# Patient Record
Sex: FEMALE | Race: WHITE | NOT HISPANIC OR LATINO | URBAN - METROPOLITAN AREA
[De-identification: names, ages, dates, MRNs, and addresses within clinical notes are randomized per-mention and may not be internally consistent; named-entity substitution may affect disease eponyms.]

---

## 2017-01-30 ENCOUNTER — OFFICE VISIT - HEALTHEAST (OUTPATIENT)
Dept: INTERNAL MEDICINE | Facility: CLINIC | Age: 82
End: 2017-01-30

## 2017-01-30 DIAGNOSIS — R05.9 COUGH: ICD-10-CM

## 2017-02-02 ENCOUNTER — COMMUNICATION - HEALTHEAST (OUTPATIENT)
Dept: INTERNAL MEDICINE | Facility: CLINIC | Age: 82
End: 2017-02-02

## 2017-04-17 ENCOUNTER — RECORDS - HEALTHEAST (OUTPATIENT)
Dept: ADMINISTRATIVE | Facility: OTHER | Age: 82
End: 2017-04-17

## 2017-04-22 ENCOUNTER — COMMUNICATION - HEALTHEAST (OUTPATIENT)
Dept: INTERNAL MEDICINE | Facility: CLINIC | Age: 82
End: 2017-04-22

## 2017-04-24 ENCOUNTER — OFFICE VISIT - HEALTHEAST (OUTPATIENT)
Dept: INTERNAL MEDICINE | Facility: CLINIC | Age: 82
End: 2017-04-24

## 2017-04-24 DIAGNOSIS — E03.9 HYPOTHYROID: ICD-10-CM

## 2017-04-24 DIAGNOSIS — R63.4 WEIGHT LOSS: ICD-10-CM

## 2017-04-24 DIAGNOSIS — I35.0 AORTIC STENOSIS: ICD-10-CM

## 2017-04-24 DIAGNOSIS — R53.83 FATIGUE: ICD-10-CM

## 2017-04-24 DIAGNOSIS — D63.1 ANEMIA OF CHRONIC KIDNEY FAILURE, STAGE 3 (MODERATE) (H): ICD-10-CM

## 2017-04-24 DIAGNOSIS — N18.30 ANEMIA OF CHRONIC KIDNEY FAILURE, STAGE 3 (MODERATE) (H): ICD-10-CM

## 2017-07-10 ENCOUNTER — COMMUNICATION - HEALTHEAST (OUTPATIENT)
Dept: INTERNAL MEDICINE | Facility: CLINIC | Age: 82
End: 2017-07-10

## 2017-07-10 DIAGNOSIS — E03.9 HYPOTHYROIDISM: ICD-10-CM

## 2017-07-25 ENCOUNTER — COMMUNICATION - HEALTHEAST (OUTPATIENT)
Dept: INTERNAL MEDICINE | Facility: CLINIC | Age: 82
End: 2017-07-25

## 2017-07-25 ENCOUNTER — OFFICE VISIT - HEALTHEAST (OUTPATIENT)
Dept: INTERNAL MEDICINE | Facility: CLINIC | Age: 82
End: 2017-07-25

## 2017-07-25 DIAGNOSIS — T14.8XXA ABRASION: ICD-10-CM

## 2017-07-27 ENCOUNTER — COMMUNICATION - HEALTHEAST (OUTPATIENT)
Dept: INTERNAL MEDICINE | Facility: CLINIC | Age: 82
End: 2017-07-27

## 2017-07-28 ENCOUNTER — RECORDS - HEALTHEAST (OUTPATIENT)
Dept: ADMINISTRATIVE | Facility: OTHER | Age: 82
End: 2017-07-28

## 2017-08-01 ENCOUNTER — COMMUNICATION - HEALTHEAST (OUTPATIENT)
Dept: INTERNAL MEDICINE | Facility: CLINIC | Age: 82
End: 2017-08-01

## 2017-08-03 ENCOUNTER — COMMUNICATION - HEALTHEAST (OUTPATIENT)
Dept: INTERNAL MEDICINE | Facility: CLINIC | Age: 82
End: 2017-08-03

## 2017-08-31 ENCOUNTER — COMMUNICATION - HEALTHEAST (OUTPATIENT)
Dept: INTERNAL MEDICINE | Facility: CLINIC | Age: 82
End: 2017-08-31

## 2017-09-01 ENCOUNTER — OFFICE VISIT - HEALTHEAST (OUTPATIENT)
Dept: INTERNAL MEDICINE | Facility: CLINIC | Age: 82
End: 2017-09-01

## 2017-09-01 DIAGNOSIS — D63.1 ANEMIA OF CHRONIC KIDNEY FAILURE, STAGE 3 (MODERATE) (H): ICD-10-CM

## 2017-09-01 DIAGNOSIS — D89.0 POLYCLONAL GAMMOPATHY: ICD-10-CM

## 2017-09-01 DIAGNOSIS — N18.30 ANEMIA OF CHRONIC KIDNEY FAILURE, STAGE 3 (MODERATE) (H): ICD-10-CM

## 2017-09-01 DIAGNOSIS — I35.0 AORTIC STENOSIS: ICD-10-CM

## 2017-09-01 DIAGNOSIS — E03.9 HYPOTHYROIDISM: ICD-10-CM

## 2017-09-01 DIAGNOSIS — N39.0 FREQUENT UTI: ICD-10-CM

## 2017-09-01 DIAGNOSIS — E03.9 HYPOTHYROID: ICD-10-CM

## 2017-09-01 DIAGNOSIS — N18.30 CKD (CHRONIC KIDNEY DISEASE) STAGE 3, GFR 30-59 ML/MIN (H): ICD-10-CM

## 2017-09-01 ASSESSMENT — MIFFLIN-ST. JEOR: SCORE: 884.39

## 2017-09-04 ENCOUNTER — COMMUNICATION - HEALTHEAST (OUTPATIENT)
Dept: INTERNAL MEDICINE | Facility: CLINIC | Age: 82
End: 2017-09-04

## 2017-09-05 ENCOUNTER — COMMUNICATION - HEALTHEAST (OUTPATIENT)
Dept: INTERNAL MEDICINE | Facility: CLINIC | Age: 82
End: 2017-09-05

## 2017-09-06 ENCOUNTER — COMMUNICATION - HEALTHEAST (OUTPATIENT)
Dept: INTERNAL MEDICINE | Facility: CLINIC | Age: 82
End: 2017-09-06

## 2017-09-07 ENCOUNTER — COMMUNICATION - HEALTHEAST (OUTPATIENT)
Dept: INTERNAL MEDICINE | Facility: CLINIC | Age: 82
End: 2017-09-07

## 2017-09-08 ENCOUNTER — RECORDS - HEALTHEAST (OUTPATIENT)
Dept: ADMINISTRATIVE | Facility: OTHER | Age: 82
End: 2017-09-08

## 2017-09-11 ENCOUNTER — COMMUNICATION - HEALTHEAST (OUTPATIENT)
Dept: INTERNAL MEDICINE | Facility: CLINIC | Age: 82
End: 2017-09-11

## 2017-09-11 ENCOUNTER — RECORDS - HEALTHEAST (OUTPATIENT)
Dept: ADMINISTRATIVE | Facility: OTHER | Age: 82
End: 2017-09-11

## 2017-09-12 ENCOUNTER — RECORDS - HEALTHEAST (OUTPATIENT)
Dept: ADMINISTRATIVE | Facility: OTHER | Age: 82
End: 2017-09-12

## 2017-09-18 ENCOUNTER — RECORDS - HEALTHEAST (OUTPATIENT)
Dept: ADMINISTRATIVE | Facility: OTHER | Age: 82
End: 2017-09-18

## 2017-09-20 ENCOUNTER — RECORDS - HEALTHEAST (OUTPATIENT)
Dept: ADMINISTRATIVE | Facility: OTHER | Age: 82
End: 2017-09-20

## 2017-09-27 ENCOUNTER — OFFICE VISIT - HEALTHEAST (OUTPATIENT)
Dept: FAMILY MEDICINE | Facility: CLINIC | Age: 82
End: 2017-09-27

## 2017-09-27 DIAGNOSIS — R50.9 FEVER: ICD-10-CM

## 2017-09-27 DIAGNOSIS — R05.9 COUGH: ICD-10-CM

## 2017-09-28 ENCOUNTER — RECORDS - HEALTHEAST (OUTPATIENT)
Dept: ADMINISTRATIVE | Facility: OTHER | Age: 82
End: 2017-09-28

## 2017-09-28 ENCOUNTER — COMMUNICATION - HEALTHEAST (OUTPATIENT)
Dept: INTERNAL MEDICINE | Facility: CLINIC | Age: 82
End: 2017-09-28

## 2017-09-29 ENCOUNTER — RECORDS - HEALTHEAST (OUTPATIENT)
Dept: ADMINISTRATIVE | Facility: OTHER | Age: 82
End: 2017-09-29

## 2017-10-05 ENCOUNTER — OFFICE VISIT - HEALTHEAST (OUTPATIENT)
Dept: ONCOLOGY | Facility: CLINIC | Age: 82
End: 2017-10-05

## 2017-10-05 ENCOUNTER — RECORDS - HEALTHEAST (OUTPATIENT)
Dept: ADMINISTRATIVE | Facility: OTHER | Age: 82
End: 2017-10-05

## 2017-10-05 DIAGNOSIS — D63.1 ANEMIA OF CHRONIC KIDNEY FAILURE, STAGE 3 (MODERATE) (H): ICD-10-CM

## 2017-10-05 DIAGNOSIS — N18.30 ANEMIA OF CHRONIC KIDNEY FAILURE, STAGE 3 (MODERATE) (H): ICD-10-CM

## 2017-10-11 ENCOUNTER — OFFICE VISIT - HEALTHEAST (OUTPATIENT)
Dept: ONCOLOGY | Facility: CLINIC | Age: 82
End: 2017-10-11

## 2017-10-11 ENCOUNTER — INFUSION - HEALTHEAST (OUTPATIENT)
Dept: INFUSION THERAPY | Facility: CLINIC | Age: 82
End: 2017-10-11

## 2017-10-11 ENCOUNTER — AMBULATORY - HEALTHEAST (OUTPATIENT)
Dept: INFUSION THERAPY | Facility: CLINIC | Age: 82
End: 2017-10-11

## 2017-10-11 DIAGNOSIS — D63.1 ANEMIA OF CHRONIC KIDNEY FAILURE, STAGE 3 (MODERATE) (H): ICD-10-CM

## 2017-10-11 DIAGNOSIS — N18.30 ANEMIA OF CHRONIC KIDNEY FAILURE, STAGE 3 (MODERATE) (H): ICD-10-CM

## 2017-10-11 LAB
BASOPHILS # BLD AUTO: 0 THOU/UL (ref 0–0.2)
BASOPHILS NFR BLD AUTO: 1 % (ref 0–2)
COMPONENT (HISTORICAL CONVERSION): NORMAL
EOSINOPHIL # BLD AUTO: 0 THOU/UL (ref 0–0.4)
EOSINOPHIL NFR BLD AUTO: 1 % (ref 0–6)
ERYTHROCYTE [DISTWIDTH] IN BLOOD BY AUTOMATED COUNT: 16.1 % (ref 11–14.5)
HCT VFR BLD AUTO: 23.9 % (ref 35–47)
HGB BLD-MCNC: 7.6 G/DL (ref 12–16)
LAB AP CHARGES (HE HISTORICAL CONVERSION): NORMAL
LYMPHOCYTES # BLD AUTO: 0.9 THOU/UL (ref 0.8–4.4)
LYMPHOCYTES NFR BLD AUTO: 23 % (ref 20–40)
MCH RBC QN AUTO: 31.8 PG (ref 27–34)
MCHC RBC AUTO-ENTMCNC: 31.8 G/DL (ref 32–36)
MCV RBC AUTO: 100 FL (ref 80–100)
MONOCYTES # BLD AUTO: 0.3 THOU/UL (ref 0–0.9)
MONOCYTES NFR BLD AUTO: 7 % (ref 2–10)
NEUTROPHILS # BLD AUTO: 2.7 THOU/UL (ref 2–7.7)
NEUTROPHILS NFR BLD AUTO: 69 % (ref 50–70)
PATH REPORT.COMMENTS IMP SPEC: NORMAL
PATH REPORT.COMMENTS IMP SPEC: NORMAL
PATH REPORT.FINAL DX SPEC: NORMAL
PATH REPORT.MICROSCOPIC SPEC OTHER STN: ABNORMAL
PATH REPORT.MICROSCOPIC SPEC OTHER STN: NORMAL
PLATELET # BLD AUTO: 159 THOU/UL (ref 140–440)
PMV BLD AUTO: 10.4 FL (ref 8.5–12.5)
RBC # BLD AUTO: 2.39 MILL/UL (ref 3.8–5.4)
STATUS (HISTORICAL CONVERSION): NORMAL
WBC: 3.9 THOU/UL (ref 4–11)

## 2017-10-11 ASSESSMENT — MIFFLIN-ST. JEOR: SCORE: 862.88

## 2017-10-12 LAB
BLD PROD TYP BPU: NORMAL
BLOOD EXPIRATION DATE: NORMAL
BLOOD TYPE: 5100
CODING SYSTEM: NORMAL
COMPONENT (HISTORICAL CONVERSION): NORMAL
CROSSMATCH: NORMAL
ISSUE DATE AND TIME: NORMAL
STATUS (HISTORICAL CONVERSION): NORMAL
UNIT ABO/RH (HISTORICAL CONVERSION): NORMAL
UNIT NUMBER: NORMAL

## 2017-10-24 ENCOUNTER — COMMUNICATION - HEALTHEAST (OUTPATIENT)
Dept: INTERNAL MEDICINE | Facility: CLINIC | Age: 82
End: 2017-10-24

## 2017-11-02 ENCOUNTER — INFUSION - HEALTHEAST (OUTPATIENT)
Dept: INFUSION THERAPY | Facility: CLINIC | Age: 82
End: 2017-11-02

## 2017-11-02 ENCOUNTER — AMBULATORY - HEALTHEAST (OUTPATIENT)
Dept: INFUSION THERAPY | Facility: CLINIC | Age: 82
End: 2017-11-02

## 2017-11-02 ENCOUNTER — OFFICE VISIT - HEALTHEAST (OUTPATIENT)
Dept: ONCOLOGY | Facility: CLINIC | Age: 82
End: 2017-11-02

## 2017-11-02 DIAGNOSIS — D63.1 ANEMIA OF CHRONIC KIDNEY FAILURE, STAGE 3 (MODERATE) (H): ICD-10-CM

## 2017-11-02 DIAGNOSIS — N18.30 ANEMIA OF CHRONIC KIDNEY FAILURE, STAGE 3 (MODERATE) (H): ICD-10-CM

## 2017-11-02 DIAGNOSIS — N18.30 CKD (CHRONIC KIDNEY DISEASE) STAGE 3, GFR 30-59 ML/MIN (H): ICD-10-CM

## 2017-11-08 ENCOUNTER — COMMUNICATION - HEALTHEAST (OUTPATIENT)
Dept: INTERNAL MEDICINE | Facility: CLINIC | Age: 82
End: 2017-11-08

## 2017-11-09 ENCOUNTER — RECORDS - HEALTHEAST (OUTPATIENT)
Dept: ADMINISTRATIVE | Facility: OTHER | Age: 82
End: 2017-11-09

## 2017-11-09 ENCOUNTER — OFFICE VISIT - HEALTHEAST (OUTPATIENT)
Dept: INTERNAL MEDICINE | Facility: CLINIC | Age: 82
End: 2017-11-09

## 2017-11-09 DIAGNOSIS — R93.89 ABNORMAL X-RAY: ICD-10-CM

## 2017-11-09 DIAGNOSIS — D63.1 ANEMIA OF CHRONIC KIDNEY FAILURE, STAGE 3 (MODERATE) (H): ICD-10-CM

## 2017-11-09 DIAGNOSIS — N18.30 ANEMIA OF CHRONIC KIDNEY FAILURE, STAGE 3 (MODERATE) (H): ICD-10-CM

## 2017-11-09 DIAGNOSIS — R05.9 COUGH: ICD-10-CM

## 2017-11-10 ENCOUNTER — RECORDS - HEALTHEAST (OUTPATIENT)
Dept: ADMINISTRATIVE | Facility: OTHER | Age: 82
End: 2017-11-10

## 2017-11-15 ENCOUNTER — INFUSION - HEALTHEAST (OUTPATIENT)
Dept: INFUSION THERAPY | Facility: CLINIC | Age: 82
End: 2017-11-15

## 2017-11-15 ENCOUNTER — HOSPITAL ENCOUNTER (OUTPATIENT)
Dept: CT IMAGING | Facility: CLINIC | Age: 82
Discharge: HOME | End: 2017-11-15
Attending: INTERNAL MEDICINE

## 2017-11-15 DIAGNOSIS — N18.30 ANEMIA OF CHRONIC KIDNEY FAILURE, STAGE 3 (MODERATE) (H): ICD-10-CM

## 2017-11-15 DIAGNOSIS — R93.89 ABNORMAL X-RAY: ICD-10-CM

## 2017-11-15 DIAGNOSIS — N18.30 CKD (CHRONIC KIDNEY DISEASE) STAGE 3, GFR 30-59 ML/MIN (H): ICD-10-CM

## 2017-11-15 DIAGNOSIS — D63.1 ANEMIA OF CHRONIC KIDNEY FAILURE, STAGE 3 (MODERATE) (H): ICD-10-CM

## 2017-11-15 LAB
CREAT SERPL-MCNC: 2.11 MG/DL (ref 0.6–1.1)
GFR SERPL CREATININE-BSD FRML MDRD: 22 ML/MIN/1.73M2

## 2017-11-21 ENCOUNTER — COMMUNICATION - HEALTHEAST (OUTPATIENT)
Dept: INTERNAL MEDICINE | Facility: CLINIC | Age: 82
End: 2017-11-21

## 2017-11-21 ENCOUNTER — RECORDS - HEALTHEAST (OUTPATIENT)
Dept: ADMINISTRATIVE | Facility: OTHER | Age: 82
End: 2017-11-21

## 2017-11-21 DIAGNOSIS — J47.9 BRONCHIECTASIS (H): ICD-10-CM

## 2017-11-24 ENCOUNTER — COMMUNICATION - HEALTHEAST (OUTPATIENT)
Dept: INTERNAL MEDICINE | Facility: CLINIC | Age: 82
End: 2017-11-24

## 2017-11-27 ENCOUNTER — COMMUNICATION - HEALTHEAST (OUTPATIENT)
Dept: INTERNAL MEDICINE | Facility: CLINIC | Age: 82
End: 2017-11-27

## 2017-11-29 ENCOUNTER — AMBULATORY - HEALTHEAST (OUTPATIENT)
Dept: INFUSION THERAPY | Facility: CLINIC | Age: 82
End: 2017-11-29

## 2017-11-29 ENCOUNTER — INFUSION - HEALTHEAST (OUTPATIENT)
Dept: INFUSION THERAPY | Facility: CLINIC | Age: 82
End: 2017-11-29

## 2017-11-29 ENCOUNTER — OFFICE VISIT - HEALTHEAST (OUTPATIENT)
Dept: ONCOLOGY | Facility: CLINIC | Age: 82
End: 2017-11-29

## 2017-11-29 DIAGNOSIS — D63.1 ANEMIA OF CHRONIC KIDNEY FAILURE, STAGE 3 (MODERATE) (H): ICD-10-CM

## 2017-11-29 DIAGNOSIS — N18.30 ANEMIA OF CHRONIC KIDNEY FAILURE, STAGE 3 (MODERATE) (H): ICD-10-CM

## 2017-11-29 DIAGNOSIS — N18.30 CKD (CHRONIC KIDNEY DISEASE) STAGE 3, GFR 30-59 ML/MIN (H): ICD-10-CM

## 2017-11-29 ASSESSMENT — MIFFLIN-ST. JEOR: SCORE: 847

## 2017-12-01 ENCOUNTER — RECORDS - HEALTHEAST (OUTPATIENT)
Dept: ADMINISTRATIVE | Facility: OTHER | Age: 82
End: 2017-12-01

## 2017-12-13 ENCOUNTER — HOME CARE/HOSPICE - HEALTHEAST (OUTPATIENT)
Dept: HOME HEALTH SERVICES | Facility: HOME HEALTH | Age: 82
End: 2017-12-13

## 2017-12-13 ENCOUNTER — COMMUNICATION - HEALTHEAST (OUTPATIENT)
Dept: INTERNAL MEDICINE | Facility: CLINIC | Age: 82
End: 2017-12-13

## 2017-12-13 ENCOUNTER — COMMUNICATION - HEALTHEAST (OUTPATIENT)
Dept: HOME HEALTH SERVICES | Facility: HOME HEALTH | Age: 82
End: 2017-12-13

## 2017-12-14 ENCOUNTER — COMMUNICATION - HEALTHEAST (OUTPATIENT)
Dept: INTERNAL MEDICINE | Facility: CLINIC | Age: 82
End: 2017-12-14

## 2017-12-14 ENCOUNTER — COMMUNICATION - HEALTHEAST (OUTPATIENT)
Dept: ONCOLOGY | Facility: CLINIC | Age: 82
End: 2017-12-14

## 2017-12-20 ENCOUNTER — INFUSION - HEALTHEAST (OUTPATIENT)
Dept: INFUSION THERAPY | Facility: CLINIC | Age: 82
End: 2017-12-20

## 2017-12-20 DIAGNOSIS — N18.30 CKD (CHRONIC KIDNEY DISEASE) STAGE 3, GFR 30-59 ML/MIN (H): ICD-10-CM

## 2017-12-20 DIAGNOSIS — D63.1 ANEMIA OF CHRONIC KIDNEY FAILURE, STAGE 3 (MODERATE) (H): ICD-10-CM

## 2017-12-20 DIAGNOSIS — N18.30 ANEMIA OF CHRONIC KIDNEY FAILURE, STAGE 3 (MODERATE) (H): ICD-10-CM

## 2017-12-20 LAB
CREAT SERPL-MCNC: 3.81 MG/DL (ref 0.6–1.1)
GFR SERPL CREATININE-BSD FRML MDRD: 11 ML/MIN/1.73M2

## 2017-12-26 ENCOUNTER — COMMUNICATION - HEALTHEAST (OUTPATIENT)
Dept: INTERNAL MEDICINE | Facility: CLINIC | Age: 82
End: 2017-12-26

## 2018-01-02 ENCOUNTER — COMMUNICATION - HEALTHEAST (OUTPATIENT)
Dept: INTERNAL MEDICINE | Facility: CLINIC | Age: 83
End: 2018-01-02

## 2018-05-08 ENCOUNTER — RECORDS - HEALTHEAST (OUTPATIENT)
Dept: ADMINISTRATIVE | Facility: OTHER | Age: 83
End: 2018-05-08

## 2019-05-23 ENCOUNTER — AMBULATORY - HEALTHEAST (OUTPATIENT)
Dept: PHARMACY | Facility: HOSPITAL | Age: 84
End: 2019-05-23

## 2021-05-30 VITALS — BODY MASS INDEX: 20.72 KG/M2 | WEIGHT: 113.3 LBS

## 2021-05-31 VITALS — HEIGHT: 63 IN | WEIGHT: 109.5 LBS | BODY MASS INDEX: 19.4 KG/M2

## 2021-05-31 VITALS — HEIGHT: 63 IN | BODY MASS INDEX: 20.18 KG/M2 | WEIGHT: 113.9 LBS

## 2021-05-31 VITALS — BODY MASS INDEX: 20.53 KG/M2 | WEIGHT: 114.4 LBS

## 2021-05-31 VITALS — WEIGHT: 106 LBS | HEIGHT: 63 IN | BODY MASS INDEX: 18.78 KG/M2

## 2021-05-31 VITALS — BODY MASS INDEX: 20.01 KG/M2 | WEIGHT: 111.2 LBS

## 2021-05-31 VITALS — BODY MASS INDEX: 19.35 KG/M2 | WEIGHT: 107.5 LBS

## 2021-06-08 NOTE — PROGRESS NOTES
ASSESSMENT/PLAN:  1. Cough  With her history, and her O2 sats, I'm inclined to go ahead and treat with both antibiotic and albuterol inhaler.  I discussed the case with her son-in-law who is a retired pulmonologist and we agreed that does not sound like prednisone or inhaled steroid or likely to help her at this point.  She has a flu influenza swab pending at her residence.  This would change our course of treatment to include Tamiflu if positive.  She does not have typical symptoms that would suggest this.  I did discuss the case with radiology as well.  We have no prior films to be old compared to.  If her O2 sats remained low and they need to contact me and need to consider whether or not we should add some O2.  Let's see what happens in the next day or so with antibiotic and albuterol.  - XR Chest PA and Lateral  - Spacer W/O Mask    Patient Instructions   Start using Proair inhaler, two puffs three times daily for 4-5 days, then twice daily with a spacer.    Start taking Zithromax, two tablets today, then one tablet daily for 4 days.     Let Dr. Herman know how things are going on Thursday this week (2/2).       Return if symptoms worsen or fail to improve.    CHIEF COMPLAINT:  Chief Complaint   Patient presents with     Cough       HISTORY OF PRESENT ILLNESS:  Wendy Rock is a 89 y.o. female presenting to the clinic today for a cough. She is accompanied by her daughter and son-in-law today. She had pneumonia in her right lung last year. She had a chest XR completed today with radiology reading still pending. She states that she has not been coughing too much. She mentions that she set up the appointment because she was not feeling well. She had a loss of appetite and was more fatigued as of yesterday afternoon than she previously was. There has been one positive influenza at the place where she is living. She had a temperature of 99 degrees yesterday. Her son-in-law states that she had an oxygen  "saturation of 90% at a routine check in last week. This morning her oxygen saturation was 88-90%. She states that she has chronic phlegm in her throat/lungs. She denies any sinus congestion or eat pain. She denies any dysuria and body aches. She had a headache yesterday but it is resolved today.     REVIEW OF SYSTEMS:   Comprehensive review of systems negative except as noted above.    PFSH:  Reviewed as above.     TOBACCO USE:  History   Smoking Status     Former Smoker   Smokeless Tobacco     Never Used       VITALS:  Vitals:    01/30/17 1301   BP: 112/62   Pulse: 80   SpO2: (!) 86%     Wt Readings from Last 3 Encounters:   07/08/16 122 lb (55.3 kg)   07/01/16 122 lb (55.3 kg)   06/10/16 122 lb 4.8 oz (55.5 kg)     Estimated body mass index is 22.31 kg/(m^2) as calculated from the following:    Height as of 7/8/16: 5' 2\" (1.575 m).    Weight as of 7/8/16: 122 lb (55.3 kg).    PHYSICAL EXAM:  General Appearance: Alert, cooperative, no acute distress, appears stated age. Sounds congested in the sinuses.  Lungs: Reasonable air movement, a few crackles in right base, no wheezing.   Psychiatric: She has a normal mood and affect.     ADDITIONAL HISTORY SUMMARIZED (FROM OLD RECORDS OR HISTORY FROM SOMEONE OTHER THAN THE PATIENT OR ANOTHER HEALTHCARE PROVIDER) (2 TOTAL): None.    DECISION TO OBTAIN EXTRA INFORMATION (OLD RECORDS REQUESTED OR HISTORY FROM ANOTHER PERSON OR ACCESSING CARE EVERYWHERE) (1 TOTAL): None.     RADIOLOGY TESTS SUMMARIZED OR ORDERED (XRAY/CT/MRI/DXA) (1 TOTAL): Ordered chest XR today.    LABS REVIEWED OR ORDERED (1 TOTAL): None.     MEDICINE TESTS SUMMARIZED OR ORDERED (EKG/ECHO/COLONOSCOPY/EGD) (1 TOTAL): None.    INDEPENDENT REVIEW OF EKG OR X-RAY (2 EACH): Independent review of chest XR as ordered above; possible infiltrate RLL.       The visit lasted a total of 14 minutes face to face with the patient. Over 50% of the time was spent counseling and educating the patient about low oxygen " saturation.    I, Ligia Howell, am scribing for and in the presence of, Dr. Herman.    I, Dr. Herman, personally performed the services described in this documentation, as scribed by Ligia Howell in my presence, and it is both accurate and complete.    MEDICATIONS:  Current Outpatient Prescriptions   Medication Sig Dispense Refill     albuterol (PROVENTIL HFA;VENTOLIN HFA) 90 mcg/actuation inhaler Inhale 2 puffs 3 (three) times a day as needed for wheezing. 1 Inhaler 0     azithromycin (ZITHROMAX) 250 MG tablet Take 1 tablet (250 mg total) by mouth daily for 5 days. Take 500 mg (2 x 250 mg tablets) on day 1 followed by 250 mg (1 tablet) on days 2-5. 6 tablet 0     cholecalciferol, vitamin D3, 5,000 unit Tab Take by mouth.       escitalopram oxalate (LEXAPRO) 5 MG tablet Take 0.5 tablets (2.5 mg total) by mouth daily. 45 tablet 3     ESTRACE 0.01 % (0.1 mg/gram) vaginal cream   1     levothyroxine (SYNTHROID, LEVOTHROID) 50 MCG tablet Take 1 tablet (50 mcg total) by mouth daily. 90 tablet 3     trimethoprim (TRIMPEX) 100 mg tablet TK 1 T PO HS  1     No current facility-administered medications for this visit.        Total data points: 3

## 2021-06-12 NOTE — PROGRESS NOTES
Clinic Note    Assessment:     Assessment and Plan:  1. Abrasion  Not certain why she would have bled so much.  I did give them some silver nitrate sticks to use if she has any rebleeding and ice and pressure would be appropriate.  Should she have recurrence, might consider having her come back in for a CBC to make sure her platelets are low.  This would not be a way that I would expect her to bleed or place that one would have significant bleeding with significant thrombocytopenia.  She does have low hemoglobin chronically.     There are no Patient Instructions on file for this visit.  No Follow-up on file.         Subjective:      Wendy Rock is a 89 y.o. female here today for me to evaluate a left buttock lesion that started bleeding last night and blood for hours.  She is not on aspirin Coumadin and there was no specific trauma that they are aware of.  Is coming from the left side of the gluteal fold.  Family put pressure on it for half hour as well as ice and try to styptic pencil.  Nothing seemed to stop it.  They called me and I suggested come in to be seen.    The following portions of the patient's history were reviewed and updated as appropriate: Past medical history, allergies, medications, most recent CBC showing that her hemoglobin is relatively low and platelets are 123 at that time.  This was April    Review of Systems:    No other unusual symptoms findings or concerns or bleeding elsewhere  History   Smoking Status     Former Smoker   Smokeless Tobacco     Never Used         Objective:     Vitals:    07/25/17 1307   BP: 110/60   Patient Site: Right Arm   Patient Position: Sitting   Cuff Size: Adult Regular       Exam:  Patient looks well vital signs stable  Left buttock wound is a tiny little abrasion with no active bleeding at this time.  There was blood that was on the 4 x 4 2 x 2 that was there.  Because of what happened I did use a silver nitrate stick to press into this area which left a  little of the black silver nitrate material behind there is no active bleeding at the time.  It did not sting the patient at all.  On the right fold just across from this other lesion there was a little scab that was not bleeding.    Patient Active Problem List   Diagnosis     Aortic stenosis mild 1/15 ECHO     Lumbar scoliosis     CKD (chronic kidney disease) stage 3, GFR 30-59 ml/min     Frequent UTI     Anemia of chronic kidney failure, stage 3 (moderate)     Hypothyroid     Polyclonal gammopathy     Current Outpatient Prescriptions   Medication Sig Dispense Refill     albuterol (PROVENTIL HFA;VENTOLIN HFA) 90 mcg/actuation inhaler Inhale 2 puffs 3 (three) times a day as needed for wheezing. 1 Inhaler 0     cholecalciferol, vitamin D3, 5,000 unit Tab Take by mouth.       ESTRACE 0.01 % (0.1 mg/gram) vaginal cream   1     levothyroxine (SYNTHROID, LEVOTHROID) 50 MCG tablet TAKE 1 TABLET(50 MCG) BY MOUTH DAILY 90 tablet 0     trimethoprim (TRIMPEX) 100 mg tablet TK 1 T PO HS  1     escitalopram oxalate (LEXAPRO) 5 MG tablet Take 0.5 tablets (2.5 mg total) by mouth daily. 45 tablet 3     No current facility-administered medications for this visit.          Warren Herman    7/25/2017

## 2021-06-13 NOTE — PROGRESS NOTES
[facility] Hematology and Oncology Progress Note    Patient: Wendy Rock  MRN: [id number]  Date of Service: 10/05/2017        Reason for Visit    Follow up regarding anemia.    Assessment and Plan    ECOG Performance   ECOG Performance Status: 2 (lives in assisted living)    Distress Assessment  Distress Assessment Score: No distress: No intervention is needed today.    Pain       Ms. Wendy Rock is a 89 y.o. woman with chronic anemia dating back several years.  Saw her in consultation in July 2016.  At that time her hemoglobin was in the 8-9 range.  In the workup that we did at that time the only positive finding was inappropriately low erythropoietin level.  She does have chronic kidney disease.  She is back now for follow-up as the anemia has been worsening.    1.  The anemia has worsened to the 7 range in the last couple of months.  What is notable is that when she had her blood counts drawn on 9/27/17 the MCV is also elevated.  This makes me wonder whether she has a bone marrow problem like myelodysplasia which is adding on to the previously known anemia due to chronic kidney disease.  Even though her hemoglobin has been running in the 70s, the patient herself does not give much in the way of complaints.  She just says that she feels a bit more fatigued.  She is not eating well and has been losing weight.  Perhaps there is an element of poor nutrition also adding on now.    2.  I discussed with the patient's daughter about the family's perspective and how far they are willing to go with treatment for her.  She tells me that they are not in favor of a very aggressive approach.  She is uncertain whether they really want her to get transfusions.  She does not appear to be in favor of a bone marrow biopsy etc.  I discussed with her whether she may be willing to accept injections like Aranesp to improve her hemoglobin.  I discussed with her that these injections can spare her blood transfusions to some  extent.  I discussed about the possible side effects and the slight increase in risk of blood clots, heart attacks and strokes etc.  She finally said that she would like to discuss with her siblings before making a decision.  The patient herself does not give any clear viewpoint either way.  She says that she feels comfortable right now and does not see the need for a blood transfusion.    3.  Finally we decided to obtain some labs today to investigate whether there are any simple things that we can do to try to improve the anemia.  She will come back and see me in about a week's time and we will recheck her blood counts on that day.  Provisionally she will be scheduled for a transfusion in the infusion room on that day.  We can make a decision about overall management on that day and then decide what to do.    4.  Today we obtained a CBC differential platelets, ferritin, vitamin B12 level, serum folate level and a reticulocyte count.  Hemoglobin came back at 7.3 and the patient and daughter was comfortable with the plan to come back and see me in a week's time.    5.  I am ordering a CBC differential platelets, peripheral blood morphology and a type and screen for when she comes back.    Time spend >25 minutes face to face with the patient. More than 50 % in counseling and coordination of care.      Problem List    1. Anemia of chronic kidney failure, stage 3 (moderate)  HM1(CBC and Differential)    Ferritin    Vitamin B12    Folate, Serum    Reticulocytes    HM1 (CBC with Diff)        CC: Warren Herman MD    ______________________________________________________________________________    History of Present Illness    Ms. Wendy Rock is here to follow-up with her daughter.  She was asked to come back and see me about management of anemia by Dr. Gallagher.  Apparently her hemoglobin has been slowly declining since I last saw her.  Apparently she has also been becoming a bit more weak.  She has more  problems with memory.  She is now living in an assisted living facility.    She was seen in the ED on 9/27/17.  A pneumonia was suspected.  She was started on doxycycline.  She has about 3 days of doxycycline still left to complete.  She says that her breathing has improved.  She hardly has a cough now.  No fever.    Apparently she has not been eating very well in the assisted living facility.  Appetite is not great.  She says that she does not avoid any particular food she just does not feel hungry.  She has lost about 10 pounds of weight in the last year.    Her daughter says that after discussion that family decided not to go to see nephrology last year.  They did not see much benefit in that consultation.    No fevers or night sweats.  No lumps or bumps anywhere.  Swallowing is fine.  No recent falls.  No unusual headaches.  She denies any dizziness or chest pain or palpitation.  To the extent that she walks around in the assisted living facility she has not noticed any shortness of breath.  No abdominal pain.  No blood in stool or black stools.  No difficulty passing urine.  No numbness or tingling in hands or feet.    Please see below.  A 14 point review of system is otherwise completely negative.    Pain Status  Currently in Pain: No/denies    Review of Systems    Constitutional  Constitutional (WDL): Exceptions to WDL  Fatigue: Fatigue relieved by rest  Fever: None  Chills: None  Weight Gain: None  Weight Loss: None  Neurosensory  Neurosensory (WDL): All neurosensory elements are within defined limits  Cardiovascular  Cardiovascular (WDL): All cardiovascular elements are within defined limits  Pulmonary  Respiratory (WDL): Exceptions to WDL  Cough: Moderate symptoms, medical intervention indicated, limiting instrumental ADL (recent pneumonia)  Dyspnea: Shortness of breath with moderate exertion  Hypoxia: None  Gastrointestinal  Gastrointestinal (WDL): Exceptions to WDL  Anorexia: Loss of appetite without  alteration in eating habits  Constipation: None  Diarrhea: None  Dysphagia: None  Esophagitis: None  Nausea: Loss of appetite without alteration in eating habits (with antibx)  Pharyngitis: None  Vomitin - 2 episodes ( by 5 minutes) in 24 hrs (with antibx)  Dysgeusia: None  Dry Mouth: None  Genitourinary  Genitourinary (WDL): All genitourinary elements are within defined limits  Integumentary  Integumentary (WDL): All integumentary elements are within defined limits (easy bruising)  Patient Coping  Patient Coping: Accepting  Distress Assessment  Distress Assessment Score: No distress  Accompanied by  Accompanied by: Family Member (daughter Susan)    Past History  Past Medical History:   Diagnosis Date     Aortic stenosis      CKD (chronic kidney disease) stage 3, GFR 30-59 ml/min 6/10/2016     COPD (chronic obstructive pulmonary disease)      Hypothyroid 6/10/2016     Lumbar scoliosis      Osteoporosis     DEXA scan 10/20/2015     Pneumonia 2013 & 2016         Past Surgical History:   Procedure Laterality Date     CATARACT EXTRACTION Bilateral      TONSILLECTOMY      as a child     VARICOSE VEIN SURGERY Right        Physical Exam    Recent Vitals 10/5/2017   Height -   Weight 111 lbs 3 oz   BSA (m2) -   /53   Pulse 69   Temp -   Temp src -   SpO2 100   Some recent data might be hidden       GENERAL: Alert and oriented. Seated comfortably. In no distress.  She appears slightly forgetful at times.    HEAD: Atraumatic and normocephalic.  Has a full head of hair.    EYES: LEONCIO, EOMI.  Mild pallor.  No icterus.    Oral cavity: no mucosal lesion or tonsillar enlargement.    NECK: supple. JVP normal.  No thyroid enlargement.    LYMPH NODES: No palpable, cervical, axillary or inguinal lymphadenopathy.    CHEST: clear to auscultation bilaterally.  Symmetrical breath movements bilaterally.    CVS: S1 and S2 are heard. Regular rate and rhythm.  No murmur or gallop or rub heard.    ABDOMEN: Soft. Not  tender. Not distended.  No palpable hepatomegaly or splenomegaly.  No other mass palpable.  Bowel sounds heard.    EXTREMITIES: Warm.  No peripheral edema.    SKIN: no rash, or bruising or purpura.          Lab Results    Recent Results (from the past 240 hour(s))   Lactic Acid   Result Value Ref Range    Lactic Acid 0.7 0.5 - 2.2 mmol/L   Basic Metabolic Panel   Result Value Ref Range    Sodium 137 136 - 145 mmol/L    Potassium 4.7 3.5 - 5.0 mmol/L    Chloride 111 (H) 98 - 107 mmol/L    CO2 16 (L) 22 - 31 mmol/L    Anion Gap, Calculation 10 5 - 18 mmol/L    Glucose 98 70 - 125 mg/dL    Calcium 9.7 8.5 - 10.5 mg/dL    BUN 33 (H) 8 - 28 mg/dL    Creatinine 1.74 (H) 0.60 - 1.10 mg/dL    GFR MDRD Af Amer 33 (L) >60 mL/min/1.73m2    GFR MDRD Non Af Amer 28 (L) >60 mL/min/1.73m2   INR   Result Value Ref Range    INR 1.26 (H) 0.90 - 1.10   HM2(CBC w/o Differential)   Result Value Ref Range    WBC 9.9 4.0 - 11.0 thou/uL    RBC 2.41 (L) 3.80 - 5.40 mill/uL    Hemoglobin 7.8 (L) 12.0 - 16.0 g/dL    Hematocrit 24.9 (L) 35.0 - 47.0 %     (H) 80 - 100 fL    MCH 32.4 27.0 - 34.0 pg    MCHC 31.3 (L) 32.0 - 36.0 g/dL    RDW 17.2 (H) 11.0 - 14.5 %    Platelets 156 140 - 440 thou/uL    MPV 10.4 8.5 - 12.5 fL   Blood culture 2nd   Result Value Ref Range    Anaerobic Blood Culture Bottle No Growth No Growth, No organisms seen, bottle returned to instrument, Specimen not received    Aerobic Blood Culture Bottle No Growth No Growth, No organisms seen, bottle returned to instrument, Specimen not received   Blood culture 1st   Result Value Ref Range    Anaerobic Blood Culture Bottle No Growth No Growth, No organisms seen, bottle returned to instrument, Specimen not received    Aerobic Blood Culture Bottle No Growth No Growth, No organisms seen, bottle returned to instrument, Specimen not received   Reticulocytes   Result Value Ref Range    Retic Absolute Count 0.041 0.010 - 0.110 mill/uL   HM1 (CBC with Diff)   Result Value Ref  Range    WBC 2.8 (L) 4.0 - 11.0 thou/uL    RBC 2.26 (L) 3.80 - 5.40 mill/uL    Hemoglobin 7.3 (L) 12.0 - 16.0 g/dL    Hematocrit 23.2 (L) 35.0 - 47.0 %     (H) 80 - 100 fL    MCH 32.3 27.0 - 34.0 pg    MCHC 31.5 (L) 32.0 - 36.0 g/dL    RDW 16.7 (H) 11.0 - 14.5 %    Platelets 160 140 - 440 thou/uL    MPV 10.0 8.5 - 12.5 fL    Neutrophils % 59 50 - 70 %    Lymphocytes % 28 20 - 40 %    Monocytes % 11 (H) 2 - 10 %    Eosinophils % 2 0 - 6 %    Basophils % 1 0 - 2 %    Neutrophils Absolute 1.6 (L) 2.0 - 7.7 thou/uL    Lymphocytes Absolute 0.8 0.8 - 4.4 thou/uL    Monocytes Absolute 0.3 0.0 - 0.9 thou/uL    Eosinophils Absolute 0.1 0.0 - 0.4 thou/uL    Basophils Absolute 0.0 0.0 - 0.2 thou/uL         Imaging    Xr Chest Pa And Lateral    Result Date: 9/27/2017  XR CHEST PA AND LATERAL 9/27/2017 8:34 PM INDICATION: Cough. COMPARISON: 1/30/2017. FINDINGS: Improved but persistent right lower lobe predominant opacities. Correlate for recurrent aspiration as an etiology (recurrent pneumonia or chronic basilar fibrosis as differentials). There may be underlying bronchiectasis. Given persistence, cannot exclude an obstructing process and recommend follow-up to complete resolution versus contrast enhanced chest CT. Minimal costophrenic angle blunting may represent minimal fluid or thickening. No pulmonary edema. Normal heart size. Tortuous an atherosclerotic aorta. Bony demineralization and regions of mild vertebral body height loss. Thoracolumbar scoliosis. IVC filter in place. NOTE: ABNORMAL REPORT THE DICTATION ABOVE DESCRIBES AN ABNORMALITY FOR WHICH FOLLOW-UP IS NEEDED.         Signed by: Lauren Sanders MD

## 2021-06-13 NOTE — PROGRESS NOTES
[facility] Hematology and Oncology Progress Note    Patient: Wendy Rock  MRN: [id number]  Date of Service: 11/02/2017        Reason for Visit    Follow up regarding anemia.    Assessment and Plan    ECOG Performance   ECOG Performance Status: 2    Distress Assessment  Distress Assessment Score: No distress: No intervention is needed today.    Pain   : No pain.    Ms. Wendy Rock is a 89 y.o. woman with chronic anemia dating back several years.  Saw her in consultation in July 2016.  At that time her hemoglobin was in the 8-9 range.  In the workup that we did at that time the only positive finding was inappropriately low erythropoietin level.  The main underlying problem is anemia due to chronic kidney disease.  She has CKD stage III.  She came back for follow-up in October 2017 because of worsening of her anemia to the 7 range.  Her MCV level has also prolonged raising the possibility that she has a primary bone marrow disorder also going on.  The patient has more memory problems.  Her family was not in favor of an aggressive approach including a bone marrow biopsy.  In the labs from 10/5/17 her hemoglobin had come down to 7.3.  She also had macrocytosis.  Given B-12 and serum folate levels were normal.  The peripheral blood smear did not show clear dysplastic changes.  She and the family did not want to go for a bone marrow biopsy.  She did receive 1 unit of packed RBC transfusion on 10/11/17.    1.  Her blood counts from today were reviewed with the patient and her 2 daughters.  Hemoglobin is again 7.5.  WBC count is a bit low at 3 with an ANC of 1.9 and an absolute lymphocyte count of 0.6.  Platelets are normal at 185,000.  We again had a long discussion about what to do going forwards.  Apparently the patient feels that she did not have any improvement in her energy even after she received the blood transfusion.  Clearly both patient and daughters are not eager for an aggressive approach.  They do not  want to do further workup like a bone marrow biopsy.  I discussed with him that the options are really to continue with supportive blood transfusions depending on her hemoglobin, other option would be to start her on an erythropoiesis stimulating agent like Aranesp to try to improve her hemoglobin so that she does not need blood transfusions frequently.  They are asked what would happen if they decided against both.  I told him that I would expect her hemoglobin to slowly decline in the coming months.  As it comes down more she would feel more and more tired and weak.  The patient herself is unsure about what to do.  She appears to want to do the least possible.  After long discussion back and forth, they decided against going for another blood transfusion.  They finally decided to accept Aranesp.  They want to try and see how she feels with that.  If it appears beneficial they would like to continue.  Explained to the patient and her daughters that at any point if they feel that coming to receive the Aranesp injection is too burdensome, then they can stop.  I went through the possible side effects of Aranesp in detail and they agreed to proceed.  --We will start with an Aranesp dose of 100 mcg subcu every 2 weeks.  We will aim to get her hemoglobin up to around 9 or 10.  If the hemoglobin goes up to 11 then we will hold the Aranesp.  If he had a good response then we may be able to delay the injections to every 3 weeks.  We will need to wait and see.    2.  She could not tolerate the prenatal vitamin due to nausea.  I told him that any sort of multivitamin will be enough.  I have given a printed prescription.    3.  Return to clinic in 2 weeks with labs before the Aranesp injection.    4.  Return to clinic with MD or NP in 4 weeks with labs with an appointment for Aranesp.    Time spend >30 minutes face to face with the patient. More than 50 % in counseling and coordination of care.        Problem List    1. Anemia  of chronic kidney failure, stage 3 (moderate)  DISCONTINUED: darbepoetin carolina-polysorbate injection 100 mcg (ARANESP)   2. CKD (chronic kidney disease) stage 3, GFR 30-59 ml/min  DISCONTINUED: darbepoetin carolina-polysorbate injection 100 mcg (ARANESP)        CC: Warren Herman MD    ______________________________________________________________________________    History of Present Illness    Ms. Wendy Rock is here for follow-up with 2 of her daughters.    She says that she did not feel any different after she received 1 unit of packed RBCs 2 weeks ago.  She herself says that she feels fine.  Her daughters say that she has even lower energy.  She is finding it difficult to get out of bed even in the morning.  They feel that she is not really eating that well either.  She was taking the prenatal vitamin tablet for a few days but then she felt nauseated and had to stop it.    She walks slowly with difficulty with a walker.  She has not had any recent falls.  The patient herself is a poor historian.  She denies any problems at all at this time.    Please see below.  A 14 point review of system is otherwise completely negative.    Pain Status  Currently in Pain: No/denies    Review of Systems    Constitutional  Constitutional (WDL): Exceptions to WDL  Fatigue: Fatigue not relieved by rest - Limiting instrumental ADL  Fever: None  Chills: Mild sensation of cold, shivering, chattering of teeth (always cold)  Weight Gain: None  Neurosensory  Neurosensory (WDL): Exceptions to WDL  Peripheral Motor Neuropathy: None  Ataxia: Asymptomatic, clinical or diagnostic observations only, intervention not indicated  Peripheral Sensory Neuropathy: None  Confusion: Mild disorientation  Syncope: None  Cardiovascular  Cardiovascular (WDL): All cardiovascular elements are within defined limits  Pulmonary  Respiratory (WDL): Exceptions to WDL  Cough: Mild symptoms, nonprescription intervention indicated  Dyspnea: Shortness of  breath with moderate exertion  Hypoxia: None  Gastrointestinal  Gastrointestinal (WDL): Exceptions to WDL  Anorexia: Oral intake altered without significant weight loss or malnutrition, oral nutritional supplements indicated  Constipation: None  Diarrhea: None  Dysphagia: Symptomatic, able to eat regular diet (slight)  Esophagitis: None  Nausea: Loss of appetite without alteration in eating habits  Pharyngitis: None  Vomitin - 2 episodes ( by 5 minutes) in 24 hrs  Dysgeusia: None  Dry Mouth: None  Genitourinary  Genitourinary (WDL): Exceptions to WDL (increased incontinence past few weeks)  Urinary Frequency: None  Urinary Retention: None  Urinary Tract Pain: None  Integumentary  Integumentary (WDL): All integumentary elements are within defined limits  Patient Coping  Patient Coping: Open/discussion  Distress Assessment  Distress Assessment Score: No distress  Accompanied by  Accompanied by: Family Member    Past History  Past Medical History:   Diagnosis Date     Aortic stenosis      CKD (chronic kidney disease) stage 3, GFR 30-59 ml/min 6/10/2016     COPD (chronic obstructive pulmonary disease)      Hypothyroid 6/10/2016     Lumbar scoliosis      Osteoporosis     DEXA scan 10/20/2015     Pneumonia 2013 & 2016         Past Surgical History:   Procedure Laterality Date     CATARACT EXTRACTION Bilateral      TONSILLECTOMY      as a child     VARICOSE VEIN SURGERY Right        Physical Exam    Recent Vitals 2017   Height -   Weight -   BSA (m2) -   BP 89/54   Pulse 71   Temp -   Temp src -   SpO2 93   Some recent data might be hidden       GENERAL: Alert and oriented. Seated comfortably. In no distress.  She appears frail.  She is somewhat forgetful.    HEAD: Atraumatic and normocephalic.  Has a full head of hair.    EYES: LEONCIO, EOMI.  Pale.  No icterus.    Oral cavity: no mucosal lesion or tonsillar enlargement.    NECK: supple. JVP normal.  No thyroid enlargement.    LYMPH NODES: No  palpable, cervical, axillary or inguinal lymphadenopathy.    CHEST: clear to auscultation bilaterally.  Resonant to percussion throughout bilaterally.  Symmetrical breath movements bilaterally.    CVS: S1 and S2 are heard. Regular rate and rhythm.  No murmur or gallop or rub heard.    ABDOMEN: Soft. Not tender. Not distended.  No palpable hepatomegaly or splenomegaly.    EXTREMITIES: Warm.  No peripheral edema.    SKIN: no rash, or bruising or purpura.          Lab Results    Recent Results (from the past 240 hour(s))   HM1 (CBC with Diff)   Result Value Ref Range    WBC 3.0 (L) 4.0 - 11.0 thou/uL    RBC 2.40 (L) 3.80 - 5.40 mill/uL    Hemoglobin 7.5 (L) 12.0 - 16.0 g/dL    Hematocrit 23.7 (L) 35.0 - 47.0 %    MCV 99 80 - 100 fL    MCH 31.3 27.0 - 34.0 pg    MCHC 31.6 (L) 32.0 - 36.0 g/dL    RDW 16.6 (H) 11.0 - 14.5 %    Platelets 185 140 - 440 thou/uL    MPV 10.2 8.5 - 12.5 fL    Neutrophils % 65 50 - 70 %    Lymphocytes % 20 20 - 40 %    Monocytes % 9 2 - 10 %    Eosinophils % 6 0 - 6 %    Basophils % 1 0 - 2 %    Neutrophils Absolute 1.9 (L) 2.0 - 7.7 thou/uL    Lymphocytes Absolute 0.6 (L) 0.8 - 4.4 thou/uL    Monocytes Absolute 0.3 0.0 - 0.9 thou/uL    Eosinophils Absolute 0.2 0.0 - 0.4 thou/uL    Basophils Absolute 0.0 0.0 - 0.2 thou/uL         Imaging    No results found.      Signed by: Lauren Sanders MD

## 2021-06-13 NOTE — PROGRESS NOTES
ASSESSMENT/PLAN:   1. Cough     2. Fever         Patient is an 89yo female with a history of aortic stenosis, CKD, and moderate COPD, who presents today for evaluation of cough and fever x 1 day. She was mildly hypoxic to 91% on room air and mildly tachypneic, however no respiratory distress, no appreciable work of breathing. She is elderly and at high risk for complicated pneumonia- has been admitted several times for pneumonia.  She is a likely candidate for admission for community-acquired pneumonia. She needs more complete evaluation than can be performed in urgent care, to include stat BMP to check kidney function prior to admission and starting antibiotics.  Do not suspect any cardiac etiology of symptoms today without chest pain, and with associated ill-symptoms. Do not suspect PE without history or clot risk factors. Heart failure remains on the differential with her history of aortic stenosis, but is less likely without edema on exam.  I spoke at length with patient and family recommending patient be seen in the ER. They agreed and understood this.  I spoke with [facility] ER who recommended patient be seen at another ED as [facility] is on divert due to power outage.   I discussed with family options of going to [facility] or [facility]. They elected for [facility].   I spoke with Dr Tatum at [facility] ED who accepted patient information. Since patient is not in respiratory distress and has been in this condition all day, it is appropriate that she go via private car today.              SUBJECTIVE:   Wendy Rock is a 89 y.o. female with a history of aortic stenosis, CKD, and moderate COPD, who presents today for evaluation of cough and fever. She always has a cough but worse in the last few days. It is always productive, but more so today. Sputum is more yellow in color than usual. No hemoptysis. She admits to shortness of breath with activity, worse than her usual for the last few days. Her pulse ox was  "reportedly 85% at one point today. Her son in law noted that her resting respiratory rate was elevated and she appears to be working harder to breathe.  She did have a fever, tmax 100.3F today at 4pm. She was given a dose of Tylenol before 4pm today.   No chest pain. No PMH blood clots. No recent surgeries. She denies immobility but her family members say she does a \"lot of sitting.\"  She has decreased energy and appetite. She always has a poor appetite, and it is no worse than usual.  She denies runny nose or congestion. No nausea, vomiting. She lives in a senior facility. No known illness going around.  She has moderate COPD. She does not take her daily inhalers religiously- only when family makes her.  She had an episode of pneumonia resulting in sepsis and ARDS 5 years ago. She did have an episode of pneumonia last fall and was admitted for this at an outside hospital.      Past Medical History:  Patient Active Problem List   Diagnosis     Aortic stenosis mild 1/15 ECHO     Lumbar scoliosis     CKD (chronic kidney disease) stage 3, GFR 30-59 ml/min     Frequent UTI     Anemia of chronic kidney failure, stage 3 (moderate)     Hypothyroid     Polyclonal gammopathy       Surgical History:  Past Surgical History:   Procedure Laterality Date     CATARACT EXTRACTION Bilateral      TONSILLECTOMY      as a child     VARICOSE VEIN SURGERY Right            Family History:  Family History   Problem Relation Age of Onset     Liver cancer Father      Prostate cancer Brother      Heart disease Brother      Other Daughter      passed away in accident.     Heart disease Son      Arthritis Son      Hypothyroidism Daughter      Osteoporosis Brother      Arthritis Brother      Reviewed; Non-contributory      Social History:    History   Smoking Status     Former Smoker   Smokeless Tobacco     Never Used     Smoking: former  Alcohol use: rarely  Lives in senior facility        Current Medications:  Current Outpatient Prescriptions " on File Prior to Visit   Medication Sig Dispense Refill     cholecalciferol, vitamin D3, 5,000 unit Tab Take by mouth.       levothyroxine (SYNTHROID, LEVOTHROID) 50 MCG tablet TAKE 1 TABLET(50 MCG) BY MOUTH DAILY 90 tablet 0     trimethoprim (TRIMPEX) 100 mg tablet TK 1 T PO HS  1     albuterol (PROVENTIL HFA;VENTOLIN HFA) 90 mcg/actuation inhaler Inhale 2 puffs 3 (three) times a day as needed for wheezing. 1 Inhaler 0     escitalopram oxalate (LEXAPRO) 5 MG tablet Take 0.5 tablets (2.5 mg total) by mouth daily. 45 tablet 3     No current facility-administered medications on file prior to visit.        Allergies:   Allergies   Allergen Reactions     Ceftriaxone Hives and Nausea Only     Ciprofloxacin Hives     Lorazepam Other (See Comments)     Sees things     Penicillin G Hives and Nausea Only     Sulfamethazine Hives       I personally reviewed patient's past medical, surgical, social, family history and allergies.    ROS:  Review of Systems  A 10 point review of systems was conducted and otherwise negative unless noted in HPI.      OBJECTIVE:   Vitals:    09/27/17 1741   BP: 104/58   Pulse: 78   Resp: 24   Temp: 99  F (37.2  C)   TempSrc: Oral   SpO2: 91%   Weight: 114 lb 6.4 oz (51.9 kg)     General Appearance:  Alert, nontoxic appearing elderly female in NAD. Afebrile.    Integument: mild pallor of face. No diaphoresis.  HEENT:  Head: Atraumatic, normocephalic. Face nontraumatic.  Eyes: Conjunctiva clear, Lids normal.  Ears:  TMs pearly, translucent bilaterally. No canal erythema or edema.  Nose: nares patent. No rhinorrhea.  Oropharynx: Mild posterior pharyngeal erythema. Uvula midline. Moist mucus membranes.  Neck: Supple, no lymphadenopathy.  Respiratory: No distress- no retractions. Mild tachypnea but no appreciable increased work of breathing. Slightly diminished breath sounds in left upper lung field. No crackles, wheezes, or rales.   Cardiovascular: Regular rate and rhythm. Severe, harsh murmur heard  best in aortic position. No gallop. No peripheral edema.  Neurologic: Alert and orientated. Follows commands.

## 2021-06-13 NOTE — PROGRESS NOTES
[facility] Hematology and Oncology Progress Note    Patient: Wendy Rock  MRN: [id number]  Date of Service: 10/11/2017        Reason for Visit    Follow up regarding anemia.    Assessment and Plan    ECOG Performance   ECOG Performance Status: 2    Distress Assessment  Distress Assessment Score: No distress: No intervention is needed today.    Pain   : No pain.    Ms. Wendy Rock is a 89 y.o. woman with chronic anemia dating back several years.  Saw her in consultation in July 2016.  At that time her hemoglobin was in the 8-9 range.  In the workup that we did at that time the only positive finding was inappropriately low erythropoietin level.  She came back for follow-up in October 2017 because of worsening of her anemia to the 7 range.  Her MCV level has also prolonged raising the possibility that she has a primary bone marrow disorder also going on.  The patient has more memory problems.  Her family was not in favor of an aggressive approach including a bone marrow biopsy.    1.  I reviewed the lab results from 10/5/17 the patient and family.  Her hemoglobin had come down to 7.3.  She has macrocytosis.  She also has a mild neutropenia.  The platelet count is normal.  The ferritin was elevated.  The vitamin B-12 and serum folate levels were normal.  The reticulocyte count was not increased.  I discussed with him that at this time the anemia seems to be a combination of chronic kidney disease with a low erythropoietin level and also possibly a primary bone marrow disorder.  They still do not want a bone marrow aspiration biopsy.  They would like her to be treated palliatively.  A peripheral blood morphology has been sent today.  We will wait to see whether any dysplastic cells are seen.    2.  Today we had again another long discussion about how to treat the anemia.  The daughter who is accompanying her today he is still a bit uncertain about which approach to take.  She would like to wait till her siblings  are back to make a decision whether to start an erythropoiesis stimulating agent.  I again went through the risks and benefits of treatment with these agents.  At this time she is agreeable to trying transfusion first.  I will arrange for her to be transfused with 1 unit of packed RBCs today.  I went through the possible side effects and risks with the patient and her daughter.  Since the patient has poor memory, her daughter signed the transfusion consent for her in her presence.    3.  I asked her to start taking a prenatal vitamin tablet daily.  This should give her enough iron and vitamins.    4.  We agreed to meet again in 3-4 weeks time.  She will be able to talk to the rest of the family to make further decisions about her care.  When she comes back she may need another blood transfusion or if they have made a decision we may be able to start on Aranesp also.  Labs: CBC differential platelets and a type and screen.  Appointment will be made in the infusion room and also with me.    Time spend >25 minutes face to face with the patient. More than 50 % in counseling and coordination of care.      Problem List    1. Anemia of chronic kidney failure, stage 3 (moderate)  Type and Screen    sodium chloride 0.9% 250 mL    sodium chloride 0.9% 250 mL    ABO/Rh    Prepare RBC: 1 Units    Transfuse RBC: 1 Units    HM1(CBC and Differential)    Antibody Screen - Outpatient    Blood Type - Outpatient    prenat.vits,hector,min-iron-folic (PRENATAL VITAMIN) Tab        CC: Warren Herman MD    ______________________________________________________________________________    History of Present Illness    Ms. Wendy Rock is here for follow-up with her daughter.  Overall she feels about the same as last week.  She still somewhat forgetful.  She does not have any dizziness.  However when she walks she has some shortness of breath.  She is able to walk for only a short distance.  She uses a walker.  She denies any chest  pain or palpitation.    Apparently her appetite is poor.  She says that she eats a lot of meat.  Uncertain about the vegetables.  The family is trying to encourage her to eat more.    She has not noticed any bleeding from any site.    No fevers or night sweats.  No lumps or bumps anywhere.  No new aches or pains.  No nausea or vomiting or abdominal pain.  No complaints of constipation or diarrhea.  No blood in stool or black stools.    Please see below.  A 14 point review of system is otherwise completely negative.    Pain Status  Currently in Pain: No/denies    Review of Systems    Constitutional  Constitutional (WDL): Exceptions to WDL  Fatigue: Fatigue not relieved by rest - Limiting instrumental ADL  Fever: None  Chills: None  Weight Gain: None  Weight Loss: None  Neurosensory  Neurosensory (WDL): All neurosensory elements are within defined limits  Peripheral Motor Neuropathy: None  Ataxia: Asymptomatic, clinical or diagnostic observations only, intervention not indicated  Peripheral Sensory Neuropathy: None  Confusion: Mild disorientation  Syncope: None  Cardiovascular  Cardiovascular (WDL): All cardiovascular elements are within defined limits  Pulmonary  Respiratory (WDL): Exceptions to WDL  Cough: Mild symptoms, nonprescription intervention indicated  Dyspnea: Shortness of breath with moderate exertion  Hypoxia: None  Gastrointestinal  Gastrointestinal (WDL): Exceptions to WDL  Anorexia: Loss of appetite without alteration in eating habits  Constipation: None  Diarrhea: None  Dysphagia: None  Esophagitis: None  Nausea: None  Pharyngitis: None  Vomiting: None  Dysgeusia: None  Dry Mouth: None  Genitourinary  Genitourinary (WDL): All genitourinary elements are within defined limits  Integumentary  Integumentary (WDL): Exceptions to WDL (easy bruising)  Patient Coping  Patient Coping: Accepting  Distress Assessment  Distress Assessment Score: No distress  Accompanied by  Accompanied by: Family Member    Past  "History  Past Medical History:   Diagnosis Date     Aortic stenosis      CKD (chronic kidney disease) stage 3, GFR 30-59 ml/min 6/10/2016     COPD (chronic obstructive pulmonary disease)      Hypothyroid 6/10/2016     Lumbar scoliosis      Osteoporosis     DEXA scan 10/20/2015     Pneumonia 01/2013 & 2/2016         Past Surgical History:   Procedure Laterality Date     CATARACT EXTRACTION Bilateral      TONSILLECTOMY      as a child     VARICOSE VEIN SURGERY Right        Physical Exam    Recent Vitals 10/11/2017   Height 5' 2.5\"   Weight 109 lbs 8 oz   BSA (m2) 1.48 m2   /53   Pulse 63   Temp -   Temp src -   SpO2 97   Some recent data might be hidden       GENERAL: Alert and oriented. Seated comfortably. In no distress.  She walks with a walker.  She appears frail.  Somewhat forgetful.    HEAD: Atraumatic and normocephalic.  Has a full head of hair.    EYES: LEONCIO, EOMI.  Mild pallor.  No icterus.    Oral cavity: no mucosal lesion or tonsillar enlargement.    NECK: supple. JVP normal.  No thyroid enlargement.    LYMPH NODES: No palpable, cervical, axillary or inguinal lymphadenopathy.    CHEST: clear to auscultation bilaterally.  Symmetrical breath movements bilaterally.    CVS: S1 and S2 are heard. Regular rate and rhythm.  No murmur or gallop or rub heard.    ABDOMEN: Soft. Not tender. Not distended.  No palpable hepatomegaly or splenomegaly.  No other mass palpable.  Bowel sounds heard.    EXTREMITIES: Warm.  No peripheral edema.    SKIN: no rash, or bruising or purpura.            Lab Results    Recent Results (from the past 240 hour(s))   Ferritin   Result Value Ref Range    Ferritin 570 (H) 10 - 130 ng/mL   Vitamin B12   Result Value Ref Range    Vitamin B-12 355 213 - 816 pg/mL   Folate, Serum   Result Value Ref Range    Folate 9.9 >=3.5 ng/mL   Reticulocytes   Result Value Ref Range    Retic Absolute Count 0.041 0.010 - 0.110 mill/uL   HM1 (CBC with Diff)   Result Value Ref Range    WBC 2.8 (L) 4.0 - " 11.0 thou/uL    RBC 2.26 (L) 3.80 - 5.40 mill/uL    Hemoglobin 7.3 (L) 12.0 - 16.0 g/dL    Hematocrit 23.2 (L) 35.0 - 47.0 %     (H) 80 - 100 fL    MCH 32.3 27.0 - 34.0 pg    MCHC 31.5 (L) 32.0 - 36.0 g/dL    RDW 16.7 (H) 11.0 - 14.5 %    Platelets 160 140 - 440 thou/uL    MPV 10.0 8.5 - 12.5 fL    Neutrophils % 59 50 - 70 %    Lymphocytes % 28 20 - 40 %    Monocytes % 11 (H) 2 - 10 %    Eosinophils % 2 0 - 6 %    Basophils % 1 0 - 2 %    Neutrophils Absolute 1.6 (L) 2.0 - 7.7 thou/uL    Lymphocytes Absolute 0.8 0.8 - 4.4 thou/uL    Monocytes Absolute 0.3 0.0 - 0.9 thou/uL    Eosinophils Absolute 0.1 0.0 - 0.4 thou/uL    Basophils Absolute 0.0 0.0 - 0.2 thou/uL         Imaging    Xr Chest Pa And Lateral    Result Date: 9/27/2017  XR CHEST PA AND LATERAL 9/27/2017 8:34 PM INDICATION: Cough. COMPARISON: 1/30/2017. FINDINGS: Improved but persistent right lower lobe predominant opacities. Correlate for recurrent aspiration as an etiology (recurrent pneumonia or chronic basilar fibrosis as differentials). There may be underlying bronchiectasis. Given persistence, cannot exclude an obstructing process and recommend follow-up to complete resolution versus contrast enhanced chest CT. Minimal costophrenic angle blunting may represent minimal fluid or thickening. No pulmonary edema. Normal heart size. Tortuous an atherosclerotic aorta. Bony demineralization and regions of mild vertebral body height loss. Thoracolumbar scoliosis. IVC filter in place. NOTE: ABNORMAL REPORT THE DICTATION ABOVE DESCRIBES AN ABNORMALITY FOR WHICH FOLLOW-UP IS NEEDED.         Signed by: Lauren Sanders MD

## 2021-06-14 NOTE — PROGRESS NOTES
[facility] Hematology and Oncology Progress Note    Patient: Wendy Rock  MRN: [id number]  Date of Service: 11/29/2017         Reason for Visit:    Follow up regarding anemia.    Assessment and Plan:    1) Anemia:    89 y.o.     Chronic, dating back several years.      Workup showed inappropriately low erythropoietin level.      Anemia due to chronic kidney disease - CKD stage III.      MCV level suggested the possibility that she also may have primary bone marrow disorder.     The patient and her family were not in favor of an aggressive approach including a bone marrow biopsy.      B-12 and serum folate levels were normal.      Peripheral blood smear did not show clear dysplastic changes.      10/12/17 - received 1 unit pRBCs - did not note good bump in HgB.    Patient opted to try an erythropoiesis stimulating agent, specifically Aranesp to try and decrease the need or frequency of blood transfusions.        11/02/17 - started Aranesp @ 100 mcg subcu every 2 weeks.      11/29/17:    HgB @ 8.6 (7.5 when started Aranesp.    Patient looks good and feels stable.  She does not feel the 1+ gram increase in her Hgb since starting Aranesp.      She wishes to continue with every other week Aranesp injections and follow up provider appointments once monthly.  She was reminded that if at any point she feels that coming to receive the Aranesp injection is too burdensome, she can stop.      We will aim to get and maintain her hemoglobin around 10.  If the hemoglobin goes up to 11, we will hold the Aranesp.      If she has a good response, we may be able to delay the injections to every 3 weeks.      Her weight continues to drop, 8 pounds in two months.     Instructed on nutrition needs.  She doesn't like the food at her assisted living facility.  Encouraged room snacks and instructed on hyperKcal supplements, 1-2 daily.  Will monitor.    Aranesp 100 mcg subcu dose today.    12/27/17 - monthly follow up with every 2-week  Aranesp, 100 mcg subcu.    The patient or family will call if concerns/issues arise in the interim.    ECOG Performance:     ECOG Performance Status: 2    Distress Assessment:    Distress Assessment Score: 2        TT > 20 minutes face to face with > 50 % in counseling and coordination of care.    Fred Meyer, CNP     CC: Warren Herman MD  ______________________________________________________________________________    Interim History:    The patient presents in scheduled follow-up with one of her daughters.  She is in good spirits and looks and feels stable.  She resides in an assisted living facility.  Her hemoglobin is up over a gram since starting the Aranesp, however she states she does not feel the difference.   She ambulates with a walker and has not had any recent falls.  She continues to lose weight, stating the food where she resides is not good.      Pain Status:    Currently in Pain: No/denies    Review of Systems:    Constitutional  Fatigue: Fatigue not relieved by rest - Limiting instrumental ADL  Fever: None  Chills: Mild sensation of cold, shivering, chattering of teeth  Weight Gain: None  Weight Loss: None  Neurosensory  Peripheral Motor Neuropathy: None  Ataxia: Asymptomatic, clinical or diagnostic observations only, intervention not indicated  Peripheral Sensory Neuropathy: None  Confusion: Mild disorientation  Syncope: None  Cardiovascular  Cardiovascular (WDL): All cardiovascular elements are within defined limits  Pulmonary  Cough: Mild symptoms, nonprescription intervention indicated  Dyspnea: Shortness of breath with moderate exertion  Hypoxia: None  Gastrointestinal  Anorexia: Oral intake altered without significant weight loss or malnutrition, oral nutritional supplements indicated  Constipation: None  Diarrhea: None  Dysphagia: None  Esophagitis: None  Nausea: None  Pharyngitis: None  Vomiting: None  Dysgeusia: None  Dry Mouth: None  Genitourinary  Genitourinary (WDL): All  "genitourinary elements are within defined limits  Integumentary  Integumentary (WDL): All integumentary elements are within defined limits  Patient Coping  Patient Coping: Accepting  Distress Assessment  Distress Assessment Score: 2  Accompanied by  Accompanied by: Family Member    Past Histories:    Past Medical History:   Diagnosis Date     Aortic stenosis      CKD (chronic kidney disease) stage 3, GFR 30-59 ml/min 6/10/2016     COPD (chronic obstructive pulmonary disease)      Hypothyroid 6/10/2016     Lumbar scoliosis      Osteoporosis     DEXA scan 10/20/2015     Pneumonia 01/2013 & 2/2016         Past Surgical History:   Procedure Laterality Date     CATARACT EXTRACTION Bilateral      TONSILLECTOMY      as a child     VARICOSE VEIN SURGERY Right        Physical Exam:    Recent Vitals 11/29/2017   Height 5' 2.5\"   Weight 106 lbs   BSA (m2) 1.46 m2   BP 96/53   Pulse 73   Temp -   Temp src -   SpO2 95   Some recent data might be hidden       GENERAL: Alert and oriented. Seated comfortably. In no distress.  She appears frail.  She is somewhat forgetful.    HEAD: Atraumatic and normocephalic.  Has a full head of hair.    EYES: LEONCIO, EOMI.  Pale.  No icterus.    Oral cavity: no mucosal lesion or tonsillar enlargement.    NECK: supple. JVP normal.  No thyroid enlargement.    LYMPH NODES: No palpable, cervical, axillary or inguinal lymphadenopathy.    CHEST: clear to auscultation bilaterally.  Resonant to percussion throughout bilaterally.  Symmetrical breath movements bilaterally.    CVS: S1 and S2 are heard. Regular rate and rhythm.  No murmur or gallop or rub heard.    ABDOMEN: Soft. Not tender. Not distended.  No palpable hepatomegaly or splenomegaly.    EXTREMITIES: Warm.  No peripheral edema.    SKIN: no rash, or bruising or purpura.      Lab Results:    Reviewed with patient.    Recent Results (from the past 240 hour(s))   Culture/Gram Stain: Sputum   Result Value Ref Range    Culture Usual Key     Gram " Stain Result 1+ Polymorphonuclear leukocytes     Gram Stain Result 2+ Gram positive cocci in pairs     Gram Stain Result 2+ Gram positive bacilli, diphtheroid like     Gram Stain Result 1+ Gram positive cocci in clusters     Gram Stain Result 1+ Gram positive cocci in chains    Hemoglobin   Result Value Ref Range    Hemoglobin 8.6 (L) 12.0 - 16.0 g/dL     Imaging:    No new imaging.

## 2021-06-14 NOTE — PROGRESS NOTES
Clinic Note    Assessment:     Assessment and Plan:  1. Abnormal x-ray  We will go ahead and get CT scan since I think this would be more effective than being able to rule out ovarian pathology that would be concerning.  - CT Chest Without Contrast; Future    2. Cough  Purulent and she has this underlying lung issue.  I think she should be treated with an antibiotic and will choose Zithromax as well as montelukast to help with the cough.  Her family tells me that she is not able to use an inhaler very effectively or efficiently.    3. Anemia of chronic kidney failure, stage 3 (moderate)  She is rather significantly anemic.  She is followed by hematology.  I am certain that this is a cause for her fatigue.       Patient Instructions   Please note that if over the counter medications were taken off of your medication list, it is not because you are being asked to stop taking them.  does not want all of the over the counter medications on your medication list, as it bogs down the list.       Try using a liquid thickener like Thick-it.  Any brand is likely to be about the same.    The other option is to use a water bottle with a straw so that the head is tipped down and swallowing more safely.    CT of the chest to follow-up in the abnormal x-ray    Azithromycin 2 tabs today then 1 daily for 4 days    Montelukast 10 mg once per day for 7-14 days to help with the cough and congestion      Return if symptoms worsen or fail to improve.         Subjective:      Wendy Rock is a 89 y.o. female comes in complaining that she has had a chronic cough but over the past week or 2 cough that is usually productive of clear sputum is been productive of purulent sputum.  Patient has had significant fatigue, does not feel like getting up and doing anything.  She has a chronic anemia with hemoglobin it is in the mid 70s.  Seeing a hematologist as well.  No bleeding fevers chills.  She has not used any inhalers except as  needed albuterol and the daughters tell me that she does not do that very well.    The following portions of the patient's history were reviewed and updated as appropriate: Past medical history, allergies, medicines, allergies, lab work including hemoglobin 7.5 recently, chest x-rays showing this questionable infiltrate in asking about possible aspiration right lower lobe    Review of Systems:    As mentioned above.  Otherwise no symptoms mentioned  History   Smoking Status     Former Smoker   Smokeless Tobacco     Never Used         Objective:     Vitals:    11/09/17 1342   BP: 112/62   Pulse: 72   Temp: 97.5  F (36.4  C)       Exam:     Vital signs stable and afebrile  Cough sounds nonproductive at this time  Lungs good air movement and no wheezing no decreased breath sounds  Patient's alert cooperative seen with her 2 daughters.  Neuro nonfocal speech clear      Patient Active Problem List   Diagnosis     Aortic stenosis mild 1/15 ECHO     Lumbar scoliosis     CKD (chronic kidney disease) stage 3, GFR 30-59 ml/min     Frequent UTI     Anemia of chronic kidney failure, stage 3 (moderate)     Hypothyroid     Polyclonal gammopathy     Current Outpatient Prescriptions   Medication Sig Dispense Refill     albuterol (PROVENTIL HFA;VENTOLIN HFA) 90 mcg/actuation inhaler Inhale 2 puffs 3 (three) times a day as needed for wheezing. 1 Inhaler 0     levothyroxine (SYNTHROID, LEVOTHROID) 50 MCG tablet TAKE 1 TABLET(50 MCG) BY MOUTH DAILY 90 tablet 0     trimethoprim (TRIMPEX) 100 mg tablet TK 1 T PO HS  1     azithromycin (ZITHROMAX) 250 MG tablet Take 1 tablet (250 mg total) by mouth daily for 5 days. Take 500 mg (2 x 250 mg tablets) on day 1 followed by 250 mg (1 tablet) on days 2-5. 6 tablet 0     escitalopram oxalate (LEXAPRO) 5 MG tablet Take 0.5 tablets (2.5 mg total) by mouth daily. 45 tablet 3     montelukast (SINGULAIR) 10 mg tablet Take 1 tablet (10 mg total) by mouth daily for 14 days. 14 tablet 0     No current  facility-administered medications for this visit.          Warren Herman    11/9/2017